# Patient Record
Sex: FEMALE | Race: WHITE | NOT HISPANIC OR LATINO
[De-identification: names, ages, dates, MRNs, and addresses within clinical notes are randomized per-mention and may not be internally consistent; named-entity substitution may affect disease eponyms.]

---

## 2017-01-05 ENCOUNTER — RX RENEWAL (OUTPATIENT)
Age: 82
End: 2017-01-05

## 2017-01-26 ENCOUNTER — APPOINTMENT (OUTPATIENT)
Dept: HEART AND VASCULAR | Facility: CLINIC | Age: 82
End: 2017-01-26

## 2017-01-26 VITALS — HEART RATE: 74 BPM | DIASTOLIC BLOOD PRESSURE: 70 MMHG | SYSTOLIC BLOOD PRESSURE: 150 MMHG

## 2017-01-26 DIAGNOSIS — R06.02 SHORTNESS OF BREATH: ICD-10-CM

## 2017-05-16 ENCOUNTER — APPOINTMENT (OUTPATIENT)
Dept: HEART AND VASCULAR | Facility: CLINIC | Age: 82
End: 2017-05-16

## 2017-05-16 VITALS — HEART RATE: 73 BPM | HEIGHT: 63 IN | BODY MASS INDEX: 21.26 KG/M2 | WEIGHT: 120 LBS

## 2017-05-16 VITALS — HEART RATE: 73 BPM | DIASTOLIC BLOOD PRESSURE: 80 MMHG | SYSTOLIC BLOOD PRESSURE: 140 MMHG

## 2017-05-16 DIAGNOSIS — I10 ESSENTIAL (PRIMARY) HYPERTENSION: ICD-10-CM

## 2017-05-22 ENCOUNTER — RESULT REVIEW (OUTPATIENT)
Age: 82
End: 2017-05-22

## 2017-05-22 LAB
25(OH)D3 SERPL-MCNC: 35.1 NG/ML
ALBUMIN SERPL ELPH-MCNC: 4.1 G/DL
ALP BLD-CCNC: 80 U/L
ALT SERPL-CCNC: 19 U/L
ANION GAP SERPL CALC-SCNC: 15 MMOL/L
AST SERPL-CCNC: 14 U/L
BASOPHILS # BLD AUTO: 0.04 K/UL
BASOPHILS NFR BLD AUTO: 0.4 %
BILIRUB SERPL-MCNC: 0.3 MG/DL
BUN SERPL-MCNC: 13 MG/DL
CALCIUM SERPL-MCNC: 9.6 MG/DL
CHLORIDE SERPL-SCNC: 101 MMOL/L
CHOLEST SERPL-MCNC: 118 MG/DL
CHOLEST/HDLC SERPL: 2.7 RATIO
CO2 SERPL-SCNC: 21 MMOL/L
CREAT SERPL-MCNC: 0.75 MG/DL
EOSINOPHIL # BLD AUTO: 0.44 K/UL
EOSINOPHIL NFR BLD AUTO: 4 %
GLUCOSE SERPL-MCNC: 90 MG/DL
HBA1C MFR BLD HPLC: 5.9 %
HCT VFR BLD CALC: 38.3 %
HDLC SERPL-MCNC: 44 MG/DL
HGB BLD-MCNC: 12.4 G/DL
IMM GRANULOCYTES NFR BLD AUTO: 0.5 %
LDLC SERPL CALC-MCNC: 59 MG/DL
LYMPHOCYTES # BLD AUTO: 2.24 K/UL
LYMPHOCYTES NFR BLD AUTO: 20.2 %
MAGNESIUM RBC-MCNC: 5.1 MG/DL
MAN DIFF?: NORMAL
MCHC RBC-ENTMCNC: 28.3 PG
MCHC RBC-ENTMCNC: 32.4 GM/DL
MCV RBC AUTO: 87.4 FL
MONOCYTES # BLD AUTO: 0.86 K/UL
MONOCYTES NFR BLD AUTO: 7.7 %
NEUTROPHILS # BLD AUTO: 7.47 K/UL
NEUTROPHILS NFR BLD AUTO: 67.2 %
PLATELET # BLD AUTO: 350 K/UL
POTASSIUM SERPL-SCNC: 4.8 MMOL/L
PROT SERPL-MCNC: 6.6 G/DL
RBC # BLD: 4.38 M/UL
RBC # FLD: 14.1 %
SODIUM SERPL-SCNC: 137 MMOL/L
T3FREE SERPL-MCNC: 2.82 PG/ML
T4 FREE SERPL-MCNC: 1.3 NG/DL
TRIGL SERPL-MCNC: 75 MG/DL
TSH SERPL-ACNC: 0.91 UIU/ML
WBC # FLD AUTO: 11.11 K/UL

## 2017-06-27 ENCOUNTER — APPOINTMENT (OUTPATIENT)
Dept: HEART AND VASCULAR | Facility: CLINIC | Age: 82
End: 2017-06-27

## 2017-08-14 ENCOUNTER — TRANSCRIPTION ENCOUNTER (OUTPATIENT)
Age: 82
End: 2017-08-14

## 2017-08-15 ENCOUNTER — APPOINTMENT (OUTPATIENT)
Dept: HEART AND VASCULAR | Facility: CLINIC | Age: 82
End: 2017-08-15
Payer: MEDICARE

## 2017-08-15 VITALS
WEIGHT: 123 LBS | DIASTOLIC BLOOD PRESSURE: 80 MMHG | SYSTOLIC BLOOD PRESSURE: 130 MMHG | BODY MASS INDEX: 21.79 KG/M2 | HEART RATE: 70 BPM

## 2017-08-15 VITALS — HEART RATE: 71 BPM | DIASTOLIC BLOOD PRESSURE: 72 MMHG | SYSTOLIC BLOOD PRESSURE: 120 MMHG

## 2017-08-15 VITALS — WEIGHT: 122 LBS | HEIGHT: 63 IN | BODY MASS INDEX: 21.62 KG/M2

## 2017-08-15 DIAGNOSIS — F41.9 ANXIETY DISORDER, UNSPECIFIED: ICD-10-CM

## 2017-08-15 PROCEDURE — 99214 OFFICE O/P EST MOD 30 MIN: CPT | Mod: 25

## 2017-08-15 PROCEDURE — 93000 ELECTROCARDIOGRAM COMPLETE: CPT

## 2017-08-15 PROCEDURE — 36415 COLL VENOUS BLD VENIPUNCTURE: CPT

## 2017-08-15 PROCEDURE — 93306 TTE W/DOPPLER COMPLETE: CPT

## 2017-08-17 LAB
25(OH)D3 SERPL-MCNC: 32.1 NG/ML
ALBUMIN SERPL ELPH-MCNC: 4 G/DL
ALP BLD-CCNC: 87 U/L
ALT SERPL-CCNC: 16 U/L
ANION GAP SERPL CALC-SCNC: 17 MMOL/L
AST SERPL-CCNC: 14 U/L
BASOPHILS # BLD AUTO: 0.02 K/UL
BASOPHILS NFR BLD AUTO: 0.1 %
BILIRUB SERPL-MCNC: 0.4 MG/DL
BUN SERPL-MCNC: 13 MG/DL
CALCIUM SERPL-MCNC: 9.4 MG/DL
CHLORIDE SERPL-SCNC: 102 MMOL/L
CHOLEST SERPL-MCNC: 139 MG/DL
CHOLEST/HDLC SERPL: 2.1 RATIO
CO2 SERPL-SCNC: 22 MMOL/L
CREAT SERPL-MCNC: 0.8 MG/DL
EOSINOPHIL # BLD AUTO: 0.22 K/UL
EOSINOPHIL NFR BLD AUTO: 1.5 %
GLUCOSE SERPL-MCNC: 92 MG/DL
HBA1C MFR BLD HPLC: 6.1 %
HCT VFR BLD CALC: 39.5 %
HDLC SERPL-MCNC: 67 MG/DL
HGB BLD-MCNC: 12.2 G/DL
IMM GRANULOCYTES NFR BLD AUTO: 0.9 %
LDLC SERPL CALC-MCNC: 51 MG/DL
LYMPHOCYTES # BLD AUTO: 1.78 K/UL
LYMPHOCYTES NFR BLD AUTO: 11.8 %
MAN DIFF?: NORMAL
MCHC RBC-ENTMCNC: 27.4 PG
MCHC RBC-ENTMCNC: 30.9 GM/DL
MCV RBC AUTO: 88.6 FL
MONOCYTES # BLD AUTO: 1.09 K/UL
MONOCYTES NFR BLD AUTO: 7.2 %
NEUTROPHILS # BLD AUTO: 11.86 K/UL
NEUTROPHILS NFR BLD AUTO: 78.5 %
PLATELET # BLD AUTO: 319 K/UL
POTASSIUM SERPL-SCNC: 5.1 MMOL/L
PROT SERPL-MCNC: 6.3 G/DL
RBC # BLD: 4.46 M/UL
RBC # FLD: 15.5 %
SODIUM SERPL-SCNC: 141 MMOL/L
T3FREE SERPL-MCNC: 2.93 PG/ML
T4 FREE SERPL-MCNC: 1.6 NG/DL
TRIGL SERPL-MCNC: 105 MG/DL
TSH SERPL-ACNC: 1.64 UIU/ML
WBC # FLD AUTO: 15.1 K/UL

## 2017-08-21 ENCOUNTER — RX RENEWAL (OUTPATIENT)
Age: 82
End: 2017-08-21

## 2017-08-28 LAB — MAGNESIUM RBC-MCNC: 5.5 MG/DL

## 2017-08-30 ENCOUNTER — TRANSCRIPTION ENCOUNTER (OUTPATIENT)
Age: 82
End: 2017-08-30

## 2017-09-10 ENCOUNTER — TRANSCRIPTION ENCOUNTER (OUTPATIENT)
Age: 82
End: 2017-09-10

## 2017-09-28 ENCOUNTER — RX RENEWAL (OUTPATIENT)
Age: 82
End: 2017-09-28

## 2017-10-31 ENCOUNTER — APPOINTMENT (OUTPATIENT)
Dept: OPHTHALMOLOGY | Facility: CLINIC | Age: 82
End: 2017-10-31
Payer: MEDICARE

## 2017-10-31 PROCEDURE — 92014 COMPRE OPH EXAM EST PT 1/>: CPT

## 2017-11-13 ENCOUNTER — APPOINTMENT (OUTPATIENT)
Dept: HEART AND VASCULAR | Facility: CLINIC | Age: 82
End: 2017-11-13
Payer: MEDICARE

## 2017-11-13 VITALS — DIASTOLIC BLOOD PRESSURE: 70 MMHG | HEART RATE: 71 BPM | SYSTOLIC BLOOD PRESSURE: 132 MMHG

## 2017-11-13 VITALS — HEART RATE: 62 BPM | WEIGHT: 120 LBS | HEIGHT: 63 IN | BODY MASS INDEX: 21.26 KG/M2

## 2017-11-13 DIAGNOSIS — R73.09 OTHER ABNORMAL GLUCOSE: ICD-10-CM

## 2017-11-13 PROCEDURE — 93000 ELECTROCARDIOGRAM COMPLETE: CPT

## 2017-11-13 PROCEDURE — 36415 COLL VENOUS BLD VENIPUNCTURE: CPT

## 2017-11-13 PROCEDURE — 99214 OFFICE O/P EST MOD 30 MIN: CPT | Mod: 25

## 2017-11-17 LAB
ALBUMIN SERPL ELPH-MCNC: 4.4 G/DL
ALP BLD-CCNC: 71 U/L
ALT SERPL-CCNC: 11 U/L
ANION GAP SERPL CALC-SCNC: 18 MMOL/L
AST SERPL-CCNC: 16 U/L
BASOPHILS # BLD AUTO: 0.03 K/UL
BASOPHILS NFR BLD AUTO: 0.3 %
BILIRUB SERPL-MCNC: 0.3 MG/DL
BUN SERPL-MCNC: 14 MG/DL
CALCIUM SERPL-MCNC: 9.5 MG/DL
CHLORIDE SERPL-SCNC: 100 MMOL/L
CHOLEST SERPL-MCNC: 143 MG/DL
CHOLEST/HDLC SERPL: 2.6 RATIO
CO2 SERPL-SCNC: 20 MMOL/L
CREAT SERPL-MCNC: 0.79 MG/DL
EOSINOPHIL # BLD AUTO: 0.24 K/UL
EOSINOPHIL NFR BLD AUTO: 2.4 %
GLUCOSE SERPL-MCNC: 109 MG/DL
HBA1C MFR BLD HPLC: 5.7 %
HCT VFR BLD CALC: 39.8 %
HDLC SERPL-MCNC: 54 MG/DL
HGB BLD-MCNC: 12.7 G/DL
IMM GRANULOCYTES NFR BLD AUTO: 0.2 %
LDLC SERPL CALC-MCNC: 45 MG/DL
LYMPHOCYTES # BLD AUTO: 1.64 K/UL
LYMPHOCYTES NFR BLD AUTO: 16.2 %
MAGNESIUM RBC-MCNC: 4.1 MG/DL
MAN DIFF?: NORMAL
MCHC RBC-ENTMCNC: 28.4 PG
MCHC RBC-ENTMCNC: 31.9 GM/DL
MCV RBC AUTO: 89 FL
MONOCYTES # BLD AUTO: 0.69 K/UL
MONOCYTES NFR BLD AUTO: 6.8 %
NEUTROPHILS # BLD AUTO: 7.48 K/UL
NEUTROPHILS NFR BLD AUTO: 74.1 %
PLATELET # BLD AUTO: 336 K/UL
POTASSIUM SERPL-SCNC: 4.9 MMOL/L
PROT SERPL-MCNC: 6.7 G/DL
RBC # BLD: 4.47 M/UL
RBC # FLD: 14.2 %
SODIUM SERPL-SCNC: 138 MMOL/L
TRIGL SERPL-MCNC: 220 MG/DL
TSH SERPL-ACNC: 3.45 UIU/ML
WBC # FLD AUTO: 10.1 K/UL

## 2017-12-15 ENCOUNTER — TRANSCRIPTION ENCOUNTER (OUTPATIENT)
Age: 82
End: 2017-12-15

## 2018-02-13 ENCOUNTER — APPOINTMENT (OUTPATIENT)
Dept: HEART AND VASCULAR | Facility: CLINIC | Age: 83
End: 2018-02-13
Payer: MEDICARE

## 2018-02-13 VITALS
BODY MASS INDEX: 21.08 KG/M2 | SYSTOLIC BLOOD PRESSURE: 122 MMHG | WEIGHT: 119 LBS | DIASTOLIC BLOOD PRESSURE: 70 MMHG | HEART RATE: 67 BPM

## 2018-02-13 VITALS — DIASTOLIC BLOOD PRESSURE: 70 MMHG | SYSTOLIC BLOOD PRESSURE: 148 MMHG | HEART RATE: 67 BPM

## 2018-02-13 DIAGNOSIS — R00.2 PALPITATIONS: ICD-10-CM

## 2018-02-13 DIAGNOSIS — E78.5 HYPERLIPIDEMIA, UNSPECIFIED: ICD-10-CM

## 2018-02-13 DIAGNOSIS — I25.10 ATHEROSCLEROTIC HEART DISEASE OF NATIVE CORONARY ARTERY W/OUT ANGINA PECTORIS: ICD-10-CM

## 2018-02-13 DIAGNOSIS — Z00.00 ENCOUNTER FOR GENERAL ADULT MEDICAL EXAMINATION W/OUT ABNORMAL FINDINGS: ICD-10-CM

## 2018-02-13 PROCEDURE — 93000 ELECTROCARDIOGRAM COMPLETE: CPT

## 2018-02-13 PROCEDURE — 99214 OFFICE O/P EST MOD 30 MIN: CPT | Mod: 25

## 2018-02-13 PROCEDURE — 36415 COLL VENOUS BLD VENIPUNCTURE: CPT

## 2018-02-20 LAB
BASOPHILS # BLD AUTO: 0.03 K/UL
BASOPHILS NFR BLD AUTO: 0.3 %
EOSINOPHIL # BLD AUTO: 0.24 K/UL
EOSINOPHIL NFR BLD AUTO: 2.4 %
HBA1C MFR BLD HPLC: 5.5 %
HCT VFR BLD CALC: 37.8 %
HGB BLD-MCNC: 11.9 G/DL
IMM GRANULOCYTES NFR BLD AUTO: 0.3 %
LYMPHOCYTES # BLD AUTO: 1.41 K/UL
LYMPHOCYTES NFR BLD AUTO: 13.9 %
MAGNESIUM RBC-MCNC: 4.2 MG/DL
MAN DIFF?: NORMAL
MCHC RBC-ENTMCNC: 28.1 PG
MCHC RBC-ENTMCNC: 31.5 GM/DL
MCV RBC AUTO: 89.4 FL
MONOCYTES # BLD AUTO: 0.72 K/UL
MONOCYTES NFR BLD AUTO: 7.1 %
NEUTROPHILS # BLD AUTO: 7.72 K/UL
NEUTROPHILS NFR BLD AUTO: 76 %
PLATELET # BLD AUTO: 377 K/UL
RBC # BLD: 4.23 M/UL
RBC # FLD: 15.3 %
WBC # FLD AUTO: 10.15 K/UL

## 2018-02-21 ENCOUNTER — TRANSCRIPTION ENCOUNTER (OUTPATIENT)
Age: 83
End: 2018-02-21

## 2018-03-19 ENCOUNTER — MESSAGE (OUTPATIENT)
Age: 83
End: 2018-03-19

## 2018-03-19 ENCOUNTER — RX RENEWAL (OUTPATIENT)
Age: 83
End: 2018-03-19

## 2018-03-26 ENCOUNTER — CLINICAL ADVICE (OUTPATIENT)
Age: 83
End: 2018-03-26

## 2018-03-26 VITALS — SYSTOLIC BLOOD PRESSURE: 150 MMHG | HEART RATE: 73 BPM | DIASTOLIC BLOOD PRESSURE: 80 MMHG

## 2018-03-26 RX ORDER — HYDROCHLOROTHIAZIDE 12.5 MG/1
12.5 CAPSULE ORAL
Qty: 30 | Refills: 1 | Status: ACTIVE | COMMUNITY
Start: 2018-03-26 | End: 1900-01-01

## 2018-03-28 ENCOUNTER — APPOINTMENT (OUTPATIENT)
Dept: HEART AND VASCULAR | Facility: CLINIC | Age: 83
End: 2018-03-28
Payer: MEDICARE

## 2018-03-28 VITALS
WEIGHT: 118 LBS | DIASTOLIC BLOOD PRESSURE: 82 MMHG | HEART RATE: 72 BPM | SYSTOLIC BLOOD PRESSURE: 128 MMHG | BODY MASS INDEX: 20.9 KG/M2

## 2018-03-28 VITALS — SYSTOLIC BLOOD PRESSURE: 130 MMHG | HEART RATE: 73 BPM | DIASTOLIC BLOOD PRESSURE: 80 MMHG

## 2018-03-28 DIAGNOSIS — Z86.79 PERSONAL HISTORY OF OTHER DISEASES OF THE CIRCULATORY SYSTEM: ICD-10-CM

## 2018-03-28 PROCEDURE — 99214 OFFICE O/P EST MOD 30 MIN: CPT | Mod: 25

## 2018-03-28 PROCEDURE — 93000 ELECTROCARDIOGRAM COMPLETE: CPT

## 2018-04-24 ENCOUNTER — TRANSCRIPTION ENCOUNTER (OUTPATIENT)
Age: 83
End: 2018-04-24

## 2018-08-10 ENCOUNTER — TRANSCRIPTION ENCOUNTER (OUTPATIENT)
Age: 83
End: 2018-08-10

## 2019-02-08 ENCOUNTER — TRANSCRIPTION ENCOUNTER (OUTPATIENT)
Age: 84
End: 2019-02-08

## 2019-03-22 ENCOUNTER — TRANSCRIPTION ENCOUNTER (OUTPATIENT)
Age: 84
End: 2019-03-22

## 2019-08-07 ENCOUNTER — EMERGENCY (EMERGENCY)
Facility: HOSPITAL | Age: 84
LOS: 1 days | Discharge: HOME | End: 2019-08-07
Attending: EMERGENCY MEDICINE | Admitting: EMERGENCY MEDICINE
Payer: MEDICARE

## 2019-08-07 VITALS
OXYGEN SATURATION: 97 % | DIASTOLIC BLOOD PRESSURE: 97 MMHG | RESPIRATION RATE: 18 BRPM | HEIGHT: 63 IN | TEMPERATURE: 98 F | WEIGHT: 110.01 LBS | HEART RATE: 96 BPM | SYSTOLIC BLOOD PRESSURE: 150 MMHG

## 2019-08-07 VITALS
RESPIRATION RATE: 17 BRPM | SYSTOLIC BLOOD PRESSURE: 139 MMHG | OXYGEN SATURATION: 95 % | HEART RATE: 85 BPM | TEMPERATURE: 98 F | DIASTOLIC BLOOD PRESSURE: 79 MMHG

## 2019-08-07 LAB
ALBUMIN SERPL ELPH-MCNC: 4.2 G/DL — SIGNIFICANT CHANGE UP (ref 3.3–5)
ALP SERPL-CCNC: 73 U/L — SIGNIFICANT CHANGE UP (ref 40–120)
ALT FLD-CCNC: 12 U/L — SIGNIFICANT CHANGE UP (ref 10–45)
ANION GAP SERPL CALC-SCNC: 12 MMOL/L — SIGNIFICANT CHANGE UP (ref 5–17)
APTT BLD: 34 SEC — SIGNIFICANT CHANGE UP (ref 27.5–36.3)
AST SERPL-CCNC: 15 U/L — SIGNIFICANT CHANGE UP (ref 10–40)
BASOPHILS # BLD AUTO: 0.04 K/UL — SIGNIFICANT CHANGE UP (ref 0–0.2)
BASOPHILS NFR BLD AUTO: 0.5 % — SIGNIFICANT CHANGE UP (ref 0–2)
BILIRUB SERPL-MCNC: 0.4 MG/DL — SIGNIFICANT CHANGE UP (ref 0.2–1.2)
BUN SERPL-MCNC: 7 MG/DL — SIGNIFICANT CHANGE UP (ref 7–23)
CALCIUM SERPL-MCNC: 9.2 MG/DL — SIGNIFICANT CHANGE UP (ref 8.4–10.5)
CHLORIDE SERPL-SCNC: 107 MMOL/L — SIGNIFICANT CHANGE UP (ref 96–108)
CO2 SERPL-SCNC: 24 MMOL/L — SIGNIFICANT CHANGE UP (ref 22–31)
CREAT SERPL-MCNC: 0.59 MG/DL — SIGNIFICANT CHANGE UP (ref 0.5–1.3)
EOSINOPHIL # BLD AUTO: 0.07 K/UL — SIGNIFICANT CHANGE UP (ref 0–0.5)
EOSINOPHIL NFR BLD AUTO: 0.8 % — SIGNIFICANT CHANGE UP (ref 0–6)
EXTRA SST TUBE: SIGNIFICANT CHANGE UP
GLUCOSE SERPL-MCNC: 93 MG/DL — SIGNIFICANT CHANGE UP (ref 70–99)
HCT VFR BLD CALC: 41.1 % — SIGNIFICANT CHANGE UP (ref 34.5–45)
HGB BLD-MCNC: 13.4 G/DL — SIGNIFICANT CHANGE UP (ref 11.5–15.5)
IMM GRANULOCYTES NFR BLD AUTO: 0.3 % — SIGNIFICANT CHANGE UP (ref 0–1.5)
INR BLD: 1.12 — SIGNIFICANT CHANGE UP (ref 0.88–1.16)
LYMPHOCYTES # BLD AUTO: 1.23 K/UL — SIGNIFICANT CHANGE UP (ref 1–3.3)
LYMPHOCYTES # BLD AUTO: 13.9 % — SIGNIFICANT CHANGE UP (ref 13–44)
MCHC RBC-ENTMCNC: 28 PG — SIGNIFICANT CHANGE UP (ref 27–34)
MCHC RBC-ENTMCNC: 32.6 GM/DL — SIGNIFICANT CHANGE UP (ref 32–36)
MCV RBC AUTO: 86 FL — SIGNIFICANT CHANGE UP (ref 80–100)
MONOCYTES # BLD AUTO: 0.63 K/UL — SIGNIFICANT CHANGE UP (ref 0–0.9)
MONOCYTES NFR BLD AUTO: 7.1 % — SIGNIFICANT CHANGE UP (ref 2–14)
NEUTROPHILS # BLD AUTO: 6.85 K/UL — SIGNIFICANT CHANGE UP (ref 1.8–7.4)
NEUTROPHILS NFR BLD AUTO: 77.4 % — HIGH (ref 43–77)
NRBC # BLD: 0 /100 WBCS — SIGNIFICANT CHANGE UP (ref 0–0)
PLATELET # BLD AUTO: 346 K/UL — SIGNIFICANT CHANGE UP (ref 150–400)
POTASSIUM SERPL-MCNC: 3.9 MMOL/L — SIGNIFICANT CHANGE UP (ref 3.5–5.3)
POTASSIUM SERPL-SCNC: 3.9 MMOL/L — SIGNIFICANT CHANGE UP (ref 3.5–5.3)
PROT SERPL-MCNC: 7.3 G/DL — SIGNIFICANT CHANGE UP (ref 6–8.3)
PROTHROM AB SERPL-ACNC: 12.7 SEC — SIGNIFICANT CHANGE UP (ref 10–12.9)
RBC # BLD: 4.78 M/UL — SIGNIFICANT CHANGE UP (ref 3.8–5.2)
RBC # FLD: 12.8 % — SIGNIFICANT CHANGE UP (ref 10.3–14.5)
SODIUM SERPL-SCNC: 143 MMOL/L — SIGNIFICANT CHANGE UP (ref 135–145)
TROPONIN T SERPL-MCNC: <0.01 NG/ML — SIGNIFICANT CHANGE UP (ref 0–0.01)
TROPONIN T SERPL-MCNC: <0.01 NG/ML — SIGNIFICANT CHANGE UP (ref 0–0.01)
WBC # BLD: 8.85 K/UL — SIGNIFICANT CHANGE UP (ref 3.8–10.5)
WBC # FLD AUTO: 8.85 K/UL — SIGNIFICANT CHANGE UP (ref 3.8–10.5)

## 2019-08-07 PROCEDURE — 85610 PROTHROMBIN TIME: CPT

## 2019-08-07 PROCEDURE — 84484 ASSAY OF TROPONIN QUANT: CPT

## 2019-08-07 PROCEDURE — 83880 ASSAY OF NATRIURETIC PEPTIDE: CPT

## 2019-08-07 PROCEDURE — 71045 X-RAY EXAM CHEST 1 VIEW: CPT | Mod: 26

## 2019-08-07 PROCEDURE — 85730 THROMBOPLASTIN TIME PARTIAL: CPT

## 2019-08-07 PROCEDURE — 99284 EMERGENCY DEPT VISIT MOD MDM: CPT | Mod: 25

## 2019-08-07 PROCEDURE — 85025 COMPLETE CBC W/AUTO DIFF WBC: CPT

## 2019-08-07 PROCEDURE — 36415 COLL VENOUS BLD VENIPUNCTURE: CPT

## 2019-08-07 PROCEDURE — 80053 COMPREHEN METABOLIC PANEL: CPT

## 2019-08-07 PROCEDURE — 71045 X-RAY EXAM CHEST 1 VIEW: CPT

## 2019-08-07 PROCEDURE — 99285 EMERGENCY DEPT VISIT HI MDM: CPT

## 2019-08-07 NOTE — ED ADULT NURSE NOTE - OBJECTIVE STATEMENT
Per patient, "I couldn't catch my breath this morning. This has not happened before." Patient AA&Ox4 upon assessment. Denies chest pain or fever or cough. EKG done and placed on cardiac monitor. Per EMS pt has known history of dementia and complains of SOB daily per family.

## 2019-08-07 NOTE — ED PROVIDER NOTE - OBJECTIVE STATEMENT
Pt w/ PMHx ? early Alzheimer's vs memory loss, HTN p/w SOB. Pt reports c/o SOB, onset today. No CP, palpitations, cough, f/c. Pt denies orthopnea, PND. No LE edema. No calf pain. No hx PE / DVT. No hx malignancy. No hx lung disease. No known cardiac disease. Pt sees cardiologist Dr Laurie Olson for ? HTN

## 2019-08-07 NOTE — ED ADULT TRIAGE NOTE - CHIEF COMPLAINT QUOTE
Pt BIBA from home CO SOB.  Per EMS, family states she has a known hx of dementia and alzheimers and CO SOB daily.  EKG in progress.  Pt denies N/V/D, Fevers, CP and Dizziness.

## 2019-08-07 NOTE — ED PROVIDER NOTE - PHYSICAL EXAMINATION
Constitutional: Well appearing, well nourished, awake, alert, oriented to person, place, time/situation and in no apparent distress.  ENMT: Airway patent. Normal MM  Eyes: Clear bilaterally  Cardiac: Normal rate, regular rhythm.  Heart sounds S1, S2.  No murmurs, rubs or gallops. No JVD or LE edema  Respiratory: Breaths sounds equal and clear b/l. No W/R/R/. No increased WOB, tachypnea, hypoxia, or accessory mm use. Pt speaks in full sentences. O2 sat 98% on RA  Gastrointestinal: Abd soft, NT, ND, NABS. No guarding, rebound, or rigidity. No pulsatile abdominal masses. No organomegaly appreciated. No CVAT   Musculoskeletal: Range of motion is not limited. No LE edema or calf ttp  Neuro: Alert and oriented x 3, face symmetric and speech fluent. Strength 5/5 x 4 ext and symmetric, nml gross motor movement, nml gait. No focal deficits noted.  Skin: Skin normal color for race, warm, dry and intact. No evidence of rash.  Psych: Alert and oriented to person, place, time/situation. normal mood and affect. no apparent risk to self or others.

## 2019-08-07 NOTE — ED PROVIDER NOTE - CLINICAL SUMMARY MEDICAL DECISION MAKING FREE TEXT BOX
Pt p/w SOB. No apparent SOB on exam. Pt breathes easily, speaks in full sentences, no W/R/R, no tachypnea, hypoxia, or tachycardia. No CP. No EKG changes to suggest arrhythmia, ischemia, or infarct. No LE edema. No si / sx CHF. No hx lung disease. No PE risk factors. Low suspicion acute pathology such as ACS, decompensated CHF, PE. Check labs, EKG, CXR, d/w cardiologist Dr Olson. Dispo pending w/u and clinical status

## 2019-08-07 NOTE — ED PROVIDER NOTE - PROGRESS NOTE DETAILS
D/w LILIANA Hickey, works w/ Dr Olson - pt w/ HTN. No cardiac issues. Pt a/w anxiety, frequent ED visits, neg w/u. Pt has appt tomorrow at 2 pm w/ Dr Olson. Pt can f/u in the office. Pt's daughter contacted, will receive pt at home, can get taxi

## 2019-08-07 NOTE — ED ADULT NURSE REASSESSMENT NOTE - NS ED NURSE REASSESS COMMENT FT1
Pt's daughter cache was called and informed regarding discharge instructions. Return demo done by pt on incentive spirometer use.

## 2019-08-07 NOTE — ED PROVIDER NOTE - DIAGNOSTIC INTERPRETATION
CHEST XRAY INTERPRETATION: lungs clear, heart shadow normal, bony structures intact. no sig interval change

## 2019-08-07 NOTE — ED PROVIDER NOTE - CARE PROVIDER_API CALL
Laurie Olson (DO)  [place] [address]  Phone: [phone]  Fax: [phone]  Follow Up Time:

## 2019-08-07 NOTE — ED PROVIDER NOTE - NSFOLLOWUPINSTRUCTIONS_ED_ALL_ED_FT
Your blood work, EKG, xray of the chest, and two sets of cardiac enzymes did not reveal any acute abnormalities. You have an appointment tomorrow with Dr Olson at 2pm. Please keep the appointment.     Shortness of breath    Shortness of breath (dyspnea) means you have trouble breathing and could indicate a medical problem. Causes include lung disease, heart disease, low amount of red blood cells (anemia), poor physical fitness, being overweight, smoking, etc. Your health care provider today may not be able to find a cause for your shortness of breath after your exam. In this case, it is important to have a follow-up exam with your primary care physician as instructed. If medicines were prescribed, take them as directed for the full length of time directed. Refrain from tobacco products.    SEEK IMMEDIATE MEDICAL CARE IF YOU HAVE ANY OF THE FOLLOWING SYMPTOMS: worsening shortness of breath, chest pain, back pain, abdominal pain, fever, coughing up blood, lightheadedness/dizziness.

## 2019-08-08 PROBLEM — Z00.00 ENCOUNTER FOR PREVENTIVE HEALTH EXAMINATION: Noted: 2019-08-08

## 2019-08-12 DIAGNOSIS — R06.02 SHORTNESS OF BREATH: ICD-10-CM

## 2019-08-17 ENCOUNTER — EMERGENCY (EMERGENCY)
Facility: HOSPITAL | Age: 84
LOS: 1 days | Discharge: HOME | End: 2019-08-17
Attending: EMERGENCY MEDICINE | Admitting: EMERGENCY MEDICINE
Payer: MEDICARE

## 2019-08-17 VITALS
OXYGEN SATURATION: 97 % | DIASTOLIC BLOOD PRESSURE: 85 MMHG | SYSTOLIC BLOOD PRESSURE: 152 MMHG | RESPIRATION RATE: 16 BRPM | TEMPERATURE: 98 F | HEART RATE: 97 BPM

## 2019-08-17 VITALS
WEIGHT: 110.01 LBS | HEIGHT: 62 IN | HEART RATE: 98 BPM | DIASTOLIC BLOOD PRESSURE: 76 MMHG | OXYGEN SATURATION: 95 % | RESPIRATION RATE: 18 BRPM | TEMPERATURE: 98 F | SYSTOLIC BLOOD PRESSURE: 115 MMHG

## 2019-08-17 LAB
APPEARANCE UR: CLEAR — SIGNIFICANT CHANGE UP
BILIRUB UR-MCNC: NEGATIVE — SIGNIFICANT CHANGE UP
COLOR SPEC: YELLOW — SIGNIFICANT CHANGE UP
DIFF PNL FLD: ABNORMAL
GLUCOSE UR QL: NEGATIVE — SIGNIFICANT CHANGE UP
KETONES UR-MCNC: ABNORMAL MG/DL
LEUKOCYTE ESTERASE UR-ACNC: NEGATIVE — SIGNIFICANT CHANGE UP
NITRITE UR-MCNC: NEGATIVE — SIGNIFICANT CHANGE UP
PCP SPEC-MCNC: SIGNIFICANT CHANGE UP
PH UR: 6 — SIGNIFICANT CHANGE UP (ref 5–8)
PROT UR-MCNC: NEGATIVE MG/DL — SIGNIFICANT CHANGE UP
SP GR SPEC: <=1.005 — SIGNIFICANT CHANGE UP (ref 1–1.03)
UROBILINOGEN FLD QL: 0.2 E.U./DL — SIGNIFICANT CHANGE UP

## 2019-08-17 PROCEDURE — 87086 URINE CULTURE/COLONY COUNT: CPT

## 2019-08-17 PROCEDURE — 93005 ELECTROCARDIOGRAM TRACING: CPT

## 2019-08-17 PROCEDURE — 36415 COLL VENOUS BLD VENIPUNCTURE: CPT

## 2019-08-17 PROCEDURE — 81001 URINALYSIS AUTO W/SCOPE: CPT

## 2019-08-17 PROCEDURE — 93010 ELECTROCARDIOGRAM REPORT: CPT

## 2019-08-17 PROCEDURE — 85025 COMPLETE CBC W/AUTO DIFF WBC: CPT

## 2019-08-17 PROCEDURE — 99284 EMERGENCY DEPT VISIT MOD MDM: CPT

## 2019-08-17 PROCEDURE — 80053 COMPREHEN METABOLIC PANEL: CPT

## 2019-08-17 PROCEDURE — 99283 EMERGENCY DEPT VISIT LOW MDM: CPT | Mod: 25

## 2019-08-17 PROCEDURE — 80307 DRUG TEST PRSMV CHEM ANLYZR: CPT

## 2019-08-17 RX ORDER — SODIUM CHLORIDE 9 MG/ML
1000 INJECTION INTRAMUSCULAR; INTRAVENOUS; SUBCUTANEOUS ONCE
Refills: 0 | Status: COMPLETED | OUTPATIENT
Start: 2019-08-17 | End: 2019-08-17

## 2019-08-17 RX ADMIN — SODIUM CHLORIDE 1000 MILLILITER(S): 9 INJECTION INTRAMUSCULAR; INTRAVENOUS; SUBCUTANEOUS at 21:00

## 2019-08-17 NOTE — ED ADULT TRIAGE NOTE - CHIEF COMPLAINT QUOTE
pt not been going out of bed for 2 days, as per emt, as per her daughter pt. been taking her ativan more than the prescription last dose was 5:30pm today.

## 2019-08-17 NOTE — ED PROVIDER NOTE - CLINICAL SUMMARY MEDICAL DECISION MAKING FREE TEXT BOX
83 y/o f hx anxiety, depression, HTN presents sent for evaluation by daughter who was concerned pt was taking ativan, hasn't been getting out of bed for eating/drinking for the past day; vss, pt stating she feels fine, doesn't know why she is in ED, neuro exam intact.  Labs wnl, utox neg, no benzos noted, no rx history for benzos in [facility].  Pt asking for d/c, spoke with daughter who will pick pt up, to f/u with pmd and psych.

## 2019-08-17 NOTE — ED PROVIDER NOTE - OBJECTIVE STATEMENT
Pt w/ PMHx anxiety, HTN, memory loss BIBA from home s/p not getting OOB for the past 2 days, not eating / drinking, taking Ativan. No f/c, URI, GI, or  sx. No falls. 85 y/o f hx anxiety/depression presents BIBA for evaluation after daughter states pt not getting out of bed in the past 2 days, not eating/drinking much.  Daughter also concerned pt may be taking ativan, states had prescription bottle which isn't hers and has seen her taking pills which she looked up online and says they match ativan.  Pt has no complaints, stating she doesn't know why EMS brought her to ED.  Pt stating she does take ativan which her psychiatrist prescribes her.

## 2019-08-17 NOTE — ED ADULT NURSE NOTE - OBJECTIVE STATEMENT
Pt states "I don't know why I am here". Pt called her dtr who stated "she has not been getting out of bed like she used to and her MD Cr and I thinks she's abusing Ativan. It was not prescribed to her. We think she is getting it from somewhere else. She took at least two 0.5mg of Ativan tonight after 5:30pm." Pt able to ambulate. Pt denies CP/SOB/Pain

## 2019-08-17 NOTE — ED PROVIDER NOTE - NSFOLLOWUPINSTRUCTIONS_ED_ALL_ED_FT
Pt and family stating [facility] to [facility] should have gone [facility]' PA and charge nurse notified, upon going in family have left. Last seen stable and in no signs of distress Health Maintenance After Age 65  After age 65, you are at a higher risk for certain long-term diseases and infections as well as injuries from falls. Falls are a major cause of broken bones and head injuries in people who are older than age 65. Getting regular preventive care can help to keep you healthy and well. Preventive care includes getting regular testing and making lifestyle changes as recommended by your health care provider. Talk with your health care provider about:  Which screenings and tests you should have. A screening is a test that checks for a disease when you have no symptoms.  A diet and exercise plan that is right for you.  What should I know about screenings and tests to prevent falls?  Screening and testing are the best ways to find a health problem early. Early diagnosis and treatment give you the best chance of managing medical conditions that are common after age 65. Certain conditions and lifestyle choices may make you more likely to have a fall. Your health care provider may recommend:  Regular vision checks. Poor vision and conditions such as cataracts can make you more likely to have a fall. If you wear glasses, make sure to get your prescription updated if your vision changes.  Medicine review. Work with your health care provider to regularly review all of the medicines you are taking, including over-the-counter medicines. Ask your health care provider about any side effects that may make you more likely to have a fall. Tell your health care provider if any medicines that you take make you feel dizzy or sleepy.  Osteoporosis screening. Osteoporosis is a condition that causes the bones to get weaker. This can make the bones weak and cause them to break more easily.  Blood pressure screening. Blood pressure changes and medicines to control blood pressure can make you feel dizzy.  Strength and balance checks. Your health care provider may recommend certain tests to check your strength and balance while standing, walking, or changing positions.  Foot health exam. Foot pain and numbness, as well as not wearing proper footwear, can make you more likely to have a fall.  Depression screening. You may be more likely to have a fall if you have a fear of falling, feel emotionally low, or feel unable to do activities that you used to do.  Alcohol use screening. Using too much alcohol can affect your balance and may make you more likely to have a fall.  What actions can I take to lower my risk of falls?  General instructions     Talk with your health care provider about your risks for falling. Tell your health care provider if:  You fall. Be sure to tell your health care provider about all falls, even ones that seem minor.  You feel dizzy, sleepy, or off-balance.  Take over-the-counter and prescription medicines only as told by your health care provider. These include any supplements.  Eat a healthy diet and maintain a healthy weight. A healthy diet includes low-fat dairy products, low-fat (lean) meats, and fiber from whole grains, beans, and lots of fruits and vegetables.  Home safety     Remove any tripping hazards, such as rugs, cords, and clutter.  Install safety equipment such as grab bars in bathrooms and safety rails on stairs.  Keep rooms and walkways well-lit.  Activity     Image   Follow a regular exercise program to stay fit. This will help you maintain your balance. Ask your health care provider what types of exercise are appropriate for you.  If you need a cane or walker, use it as recommended by your health care provider.  Wear supportive shoes that have nonskid soles.  Lifestyle     Do not drink alcohol if your health care provider tells you not to drink.  If you drink alcohol, limit how much you have:  0–1 drink a day for women.  0–2 drinks a day for men.  Be aware of how much alcohol is in your drink. In the [place] one drink equals one typical bottle of beer (12 oz), one-half glass of wine (5 oz), or one shot of hard liquor (1½ oz).  Do not use any products that contain nicotine or tobacco, such as cigarettes and e-cigarettes. If you need help quitting, ask your health care provider.  Summary  Having a healthy lifestyle and getting preventive care can help to protect your health and wellness after age 65.  Screening and testing are the best way to find a health problem early and help you avoid having a fall. Early diagnosis and treatment give you the best chance for managing medical conditions that are more common for people who are older than age 65.  Falls are a major cause of broken bones and head injuries in people who are older than age 65. Take precautions to prevent a fall at home.  Work with your health care provider to learn what changes you can make to improve your health and wellness and to prevent falls.

## 2019-08-17 NOTE — ED PROVIDER NOTE - ATTENDING CONTRIBUTION TO CARE
Pt w/ PMHx anxiety, HTN, memory loss BIBA from home s/p not getting OOB for the past 2 days, not eating / drinking, taking Ativan. No f/c, URI, GI, or  sx. No falls. Pt w/ PMHx anxiety, HTN, memory loss BIBA from home s/p not getting OOB for the past 2 days, not eating / drinking, taking Ativan. No f/c, URI, GI, or  sx. No falls. Pt has no  complaints and states she wants to go home. Pt only c/o anxiety. Admits depression. Denies SI / HI / AH / VH. Lives w/ daughter, reports she has good relationship with daughter, and wants to go home  Constitutional: Well appearing, well nourished, awake, alert, oriented to person, place, time/situation and in no apparent distress.  ENMT: Airway patent. Normal MM  Eyes: Clear bilaterally  Cardiac: Normal rate, regular rhythm.  Heart sounds S1, S2.  No murmurs, rubs or gallops.  Respiratory: Breaths sounds equal and clear b/l. No increased WOB, tachypnea, hypoxia, or accessory mm use. Pt speaks in full sentences.   Gastrointestinal: Abd soft, NT, ND, NABS. No guarding, rebound, or rigidity. No pulsatile abdominal masses. No organomegaly appreciated. No CVAT   Musculoskeletal: Range of motion is not limited  Neuro: Alert and oriented x 3, face symmetric and speech fluent. Strength 5/5 x 4 ext and symmetric, nml gross motor movement, nml gait. No focal deficits noted.  Skin: Skin normal color for race, warm, dry and intact. No evidence of rash.  Psych: Alert and oriented to person, place, time/situation. anxious affect. no apparent risk to self or others.  FTT, anxiety. Afebrile, VSS. no sig lab dyscrasias. UTox neg. Pt looked up on [facility] and no controlled substances prescribed. Called and d/w daughter. Will d/c w/ f/u PCP

## 2019-08-19 LAB
CULTURE RESULTS: SIGNIFICANT CHANGE UP
SPECIMEN SOURCE: SIGNIFICANT CHANGE UP

## 2019-08-25 DIAGNOSIS — Z00.8 ENCOUNTER FOR OTHER GENERAL EXAMINATION: ICD-10-CM

## 2019-09-13 ENCOUNTER — APPOINTMENT (OUTPATIENT)
Dept: PULMONOLOGY | Facility: CLINIC | Age: 84
End: 2019-09-13

## 2020-09-28 NOTE — ED ADULT NURSE NOTE - NSIMPLEMENTINTERV_GEN_ALL_ED
Detail Level: Generalized
Detail Level: Zone
Implemented All Fall with Harm Risk Interventions:  [place] to call system. Call bell, personal items and telephone within reach. Instruct patient to call for assistance. Room bathroom lighting operational. Non-slip footwear when patient is off stretcher. Physically safe environment: no spills, clutter or unnecessary equipment. Stretcher in lowest position, wheels locked, appropriate side rails in place. Provide visual cue, wrist band, yellow gown, etc. Monitor gait and stability. Monitor for mental status changes and reorient to person, place, and time. Review medications for side effects contributing to fall risk. Reinforce activity limits and safety measures with patient and family. Provide visual clues: red socks.

## 2022-05-18 NOTE — ED ADULT NURSE NOTE - MUSCULOSKELETAL WDL
"Chief Complaint   Patient presents with     RECHECK     Follow up with PET scan prior       Blood pressure (!) 204/103, pulse 106, temperature 98.6  F (37  C), temperature source Temporal, height 1.727 m (5' 8\"), weight 75.3 kg (166 lb), SpO2 97 %.    Risa Givens, EMT    "
Full range of motion of upper and lower extremities, no joint tenderness/swelling.

## 2022-08-25 NOTE — ED ADULT TRIAGE NOTE - NSWEIGHTCALCTOOLDRUG_GEN_A_CORE
Specialty Pharmacy - Refill Coordination    Specialty Medication Orders Linked to Encounter    Flowsheet Row Most Recent Value   Medication #1 etanercept (ENBREL SURECLICK) 50 mg/mL (1 mL) (Order#[id number], Rx#[id number])          Refill Questions - Documented Responses    Flowsheet Row Most Recent Value   Refill Screening Questions    Changes to allergies? No   Changes to medications? No   New conditions since last clinic visit? No   Unplanned office visit, urgent care, ED, or hospital admission in the last 4 weeks? No   How does patient/caregiver feel medication is working? Good   Financial problems or insurance changes? No   How many doses of your specialty medications were missed in the last 4 weeks? 0   Would patient like to speak to a pharmacist? No   When does the patient need to receive the medication? 08/30/22   Refill Delivery Questions    How will the patient receive the medication? Pickup   When does the patient need to receive the medication? 08/30/22   Shipping Address Home   Address in [facility] confirmed and updated if neccessary? Yes   Expected Copay ($) 0   Is the patient able to afford the medication copay? Yes   Payment Method zero copay   Days supply of Refill 28   Supplies needed? No supplies needed   Refill activity completed? Yes   Refill activity plan Refill scheduled   Shipment/Pickup Date: 08/26/22          Current Outpatient Medications   Medication Sig    amLODIPine (NORVASC) 5 MG tablet Take 1 tablet (5 mg total) by mouth once daily.    aspirin (ECOTRIN) 81 MG EC tablet Take 81 mg by mouth once daily.    atorvastatin (LIPITOR) 10 MG tablet Take 1 tablet (10 mg total) by mouth once daily.    azelastine (ASTELIN) 137 mcg (0.1 %) nasal spray spray 1 spray (137 mcg total) by Nasal route 2 (two) times daily. (Patient not taking: Reported on 8/9/2022)    chlorhexidine (PERIDEX) 0.12 % solution Use 15-20 mL to rinse mouth twice daily for 2 minutes, spit out and do not rinse mouth  with water after.    cyanocobalamin, vitamin B-12, (VITAMIN B-12 ORAL) Take by mouth.    cycloSPORINE (RESTASIS) 0.05 % ophthalmic emulsion Apply 1 drop into both eyes twice a day    desoximetasone (TOPICORT) 0.25 % cream Apply to affected area every other night x 1 month    ergocalciferol (ERGOCALCIFEROL) 50,000 unit Cap Take 1 capsule (50,000 Units total) by mouth every 7 days.    etanercept (ENBREL SURECLICK) 50 mg/mL (1 mL) Inject 1 mL (50 mg total) into the skin once a week.    fluticasone propion-salmeterol 115-21 mcg/dose (ADVAIR HFA) 115-21 mcg/actuation HFAA inhaler Inhale 2 puffs into the lungs every 12 (twelve) hours.    fluticasone propionate (FLONASE) 50 mcg/actuation nasal spray 1 spray (50 mcg total) by Each Nostril route once daily.    folic acid (FOLVITE) 1 MG tablet Take 4 tablets (4 mg total) by mouth once daily.    gabapentin (NEURONTIN) 300 MG capsule Take 1 capsule (300 mg total) by mouth 2 (two) times daily.    HYDROcodone-acetaminophen (NORCO) 5-325 mg per tablet Take 1 tablet by mouth every 6 (six) hours as needed for Pain.    hydrOXYzine HCL (ATARAX) 25 MG tablet Take 1 tablet (25 mg total) by mouth every evening.    leflunomide (ARAVA) 20 MG Tab Take 1 tablet (20 mg total) by mouth once daily.    losartan (COZAAR) 100 MG tablet Take 1 tablet (100 mg total) by mouth once daily.    metoprolol succinate (TOPROL-XL) 25 MG 24 hr tablet Take 1 tablet (25 mg total) by mouth every evening.    nystatin (MYCOSTATIN) powder Apply to affected area every morning as needed for flare under breasts (Patient taking differently: Apply to affected area every morning as needed for flare under breasts)    promethazine (PHENERGAN) 25 MG tablet Take 1 tablet (25 mg total) by mouth every 6 (six) hours as needed for Nausea.    PROTOPIC 0.1 % ointment AAA bid    ruxolitinib (OPZELURA) 1.5 % Crea aaa bid    triamcinolone acetonide 0.025% (KENALOG) 0.025 % cream Apply to affected area every day to  twice a day to neck . Not more than 1-2 weeks straight in same location    triamcinolone acetonide 0.1% (KENALOG) 0.1 % ointment Apply topically 2 (two) times daily. For 2 weeks    ZINC ORAL Take by mouth.   Last reviewed on 8/9/2022  1:27 PM by Milton Eli MD    Review of patient's allergies indicates:  No Known Allergies Last reviewed on  8/9/2022 1:27 PM by Milton Eli      Tasks added this encounter   9/20/2022 - Refill Call (Auto Added)  8/27/2022 - Pickup Reminder   Tasks due within next 3 months   No tasks due.     Ofelia Camilo, PharmD  [facility]  [address] [place] [id number]  Phone: [phone]  Fax: [phone]        used

## 2023-03-09 NOTE — ED PROVIDER NOTE - ENMT, MLM
Airway patent Otezla Pregnancy And Lactation Text: This medication is Pregnancy Category C and it isn't known if it is safe during pregnancy. It is unknown if it is excreted in breast milk.

## 2023-05-31 NOTE — ED ADULT NURSE NOTE - TEMPLATE
Respiratory Elidel Counseling: Patient may experience a mild burning sensation during topical application. Elidel is not approved in children less than 2 years of age. There have been case reports of hematologic and skin malignancies in patients using topical calcineurin inhibitors although causality is questionable.